# Patient Record
Sex: FEMALE | Race: ASIAN | NOT HISPANIC OR LATINO | URBAN - METROPOLITAN AREA
[De-identification: names, ages, dates, MRNs, and addresses within clinical notes are randomized per-mention and may not be internally consistent; named-entity substitution may affect disease eponyms.]

---

## 2022-03-04 ENCOUNTER — HOSPITAL ENCOUNTER (OUTPATIENT)
Dept: GENERAL RADIOLOGY | Facility: CLINIC | Age: 37
Discharge: HOME | End: 2022-03-04
Attending: NURSE PRACTITIONER | Admitting: NURSE PRACTITIONER

## 2022-03-04 DIAGNOSIS — Z22.7 TB LUNG, LATENT: ICD-10-CM

## 2022-03-04 PROCEDURE — 71046 X-RAY EXAM CHEST 2 VIEWS: CPT

## 2022-03-04 PROCEDURE — 71046 X-RAY EXAM CHEST 2 VIEWS: CPT | Mod: 26 | Performed by: RADIOLOGY

## 2022-03-10 ENCOUNTER — NURSE TRIAGE (OUTPATIENT)
Dept: NURSING | Facility: CLINIC | Age: 37
End: 2022-03-10

## 2022-03-10 NOTE — TELEPHONE ENCOUNTER
Pt calling for results of chest x-ray, ordered by [facility] per pt.     Advised pt to contact [place] or Dr. Liu for results.